# Patient Record
Sex: MALE | Race: BLACK OR AFRICAN AMERICAN | Employment: UNEMPLOYED | URBAN - METROPOLITAN AREA
[De-identification: names, ages, dates, MRNs, and addresses within clinical notes are randomized per-mention and may not be internally consistent; named-entity substitution may affect disease eponyms.]

---

## 2021-11-19 ENCOUNTER — HOSPITAL ENCOUNTER (EMERGENCY)
Age: 21
Discharge: HOME | End: 2021-11-19
Attending: EMERGENCY MEDICINE
Payer: COMMERCIAL

## 2021-11-19 VITALS
OXYGEN SATURATION: 95 % | SYSTOLIC BLOOD PRESSURE: 141 MMHG | RESPIRATION RATE: 18 BRPM | BODY MASS INDEX: 25.77 KG/M2 | TEMPERATURE: 99.2 F | HEART RATE: 98 BPM | DIASTOLIC BLOOD PRESSURE: 75 MMHG | HEIGHT: 70 IN | WEIGHT: 180 LBS

## 2021-11-19 DIAGNOSIS — H60.392 INFECTIVE OTITIS EXTERNA OF LEFT EAR: Primary | ICD-10-CM

## 2021-11-19 PROCEDURE — 99283 EMERGENCY DEPT VISIT LOW MDM: CPT

## 2021-11-19 PROCEDURE — 6370000000 HC RX 637 (ALT 250 FOR IP): Performed by: EMERGENCY MEDICINE

## 2021-11-19 RX ORDER — IBUPROFEN 800 MG/1
800 TABLET ORAL EVERY 8 HOURS PRN
Qty: 20 TABLET | Refills: 0 | Status: SHIPPED | OUTPATIENT
Start: 2021-11-19

## 2021-11-19 RX ORDER — IBUPROFEN 800 MG/1
800 TABLET ORAL ONCE
Status: COMPLETED | OUTPATIENT
Start: 2021-11-19 | End: 2021-11-19

## 2021-11-19 RX ADMIN — IBUPROFEN 800 MG: 800 TABLET, FILM COATED ORAL at 08:07

## 2021-11-19 ASSESSMENT — PAIN SCALES - GENERAL
PAINLEVEL_OUTOF10: 9
PAINLEVEL_OUTOF10: 9

## 2021-11-19 NOTE — ED PROVIDER NOTES
[facility] Emergency Department      Pt Name: Mariely Simms  MRN: [id number]  [place] 2000  Date of evaluation: 11/19/2021  Provider: Niko Lund MD  CHIEF COMPLAINT  Chief Complaint   Patient presents with    Otalgia     left ear pain times one week     HPI  Mariely Simms is a 24 y.o. male who presents because of left ear pain. Had pain for about a week. He is not had any draining and denies any hearing loss. He has had ear infections in the past, typically involving the left ear. He denies any fever or chills. Denies any nasal congestion. REVIEW OF SYSTEMS:  No fever, no nausea, no sob, no cp See HPI for further details. Remainder of pertinent ROS reviewed and negative. Nursing notes reviewed. PAST MEDICAL HISTORY  History reviewed. No pertinent past medical history. SURGICAL HISTORY  History reviewed. No pertinent surgical history. MEDICATIONS:  No current facility-administered medications on file prior to encounter. No current outpatient medications on file prior to encounter. ALLERGIES  Patient has no known allergies. SOCIAL HISTORY:  Social History     Tobacco Use    Smoking status: Never Smoker    Smokeless tobacco: Never Used   Substance Use Topics    Alcohol use: Yes     Comment: social    Drug use: Not Currently     IMMUNIZATIONS:    There is no immunization history on file for this patient. PHYSICAL EXAM  VITAL SIGNS:  Blood pressure (!) 141/75, pulse 98, temperature 99.2 °F (37.3 °C), temperature source Oral, resp. rate 18, height 5' 10\" (1.778 m), weight 180 lb (81.6 kg), SpO2 95 %.   Constitutional:  24 y.o. male who does not appear toxic or acutely ill  HENT:  Atraumatic, mucous membranes moist, left tympanic membrane, the visualized portion appears to be intact and without erythema, the auditory canal has exudate present and there is some tenderness with pulling on the tragus, external part of the ear appears normal and there is no mastoid tenderness, throat appears normal  Eyes:   Conjunctiva clear, no icterus  Neck:  Supple, no signs of injury, good ROM  Thorax & Lungs:  Respiratory effort normal  Abdomen:  Non distended  Back:  No deformity  Extremities:  No cyanosis, no edema  Skin:  Warm, dry  Neurologic:  Alert, no slurred speech    DIAGNOSTIC RESULTS:  RADIOLOGY:  None     ED COURSE:    Medications administered:  Medications   ibuprofen (ADVIL;MOTRIN) tablet 800 mg (800 mg Oral Given 11/19/21 0807)     PROCEDURES:  None    CONSULTATIONS:  None    MEDICAL DECISION MAKING: Alyssa Borden is a 24 y.o. male who presented because of ear pain. I will initiate eardrops and have him follow-up with primary care. Alyssa Borden was given appropriate discharge instructions. Referral to follow up provider. Discharge Medication List as of 11/19/2021  8:04 AM      START taking these medications    Details   ibuprofen (ADVIL;MOTRIN) 800 MG tablet Take 1 tablet by mouth every 8 hours as needed for Pain, Disp-20 tablet, R-0Print      neomycin-polymyxin-hydrocortisone (CORTISPORIN) 3.5-76808-8 otic solution Place 4 drops into the left ear 3 times daily for 7 days, Disp-1 each, R-0Print           FOLLOW UP:    Christen Vasquez [address]  [address] [facility]  [phone]    Schedule an appointment as soon as possible for a visit       FINAL IMPRESSION:    1. Infective otitis externa of left ear        (Please note that I used voice recognition software to generate this note.   Occasionally words are mistranscribed despite my efforts to edit errors.)       Juan Sandhu MD  11/19/21 1914